# Patient Record
Sex: FEMALE | Race: OTHER | URBAN - METROPOLITAN AREA
[De-identification: names, ages, dates, MRNs, and addresses within clinical notes are randomized per-mention and may not be internally consistent; named-entity substitution may affect disease eponyms.]

---

## 2024-06-12 ENCOUNTER — OFFICE VISIT (OUTPATIENT)
Dept: URBAN - METROPOLITAN AREA CLINIC 70 | Facility: CLINIC | Age: 49
End: 2024-06-12
Payer: COMMERCIAL

## 2024-06-12 ENCOUNTER — LAB OUTSIDE AN ENCOUNTER (OUTPATIENT)
Dept: URBAN - METROPOLITAN AREA CLINIC 70 | Facility: CLINIC | Age: 49
End: 2024-06-12

## 2024-06-12 ENCOUNTER — DASHBOARD ENCOUNTERS (OUTPATIENT)
Age: 49
End: 2024-06-12

## 2024-06-12 VITALS
BODY MASS INDEX: 35.68 KG/M2 | SYSTOLIC BLOOD PRESSURE: 136 MMHG | HEART RATE: 61 BPM | WEIGHT: 189 LBS | HEIGHT: 61 IN | DIASTOLIC BLOOD PRESSURE: 87 MMHG | TEMPERATURE: 98.1 F

## 2024-06-12 DIAGNOSIS — R10.9 RIGHT SIDED ABDOMINAL PAIN: ICD-10-CM

## 2024-06-12 DIAGNOSIS — K82.4 GALLBLADDER POLYP: ICD-10-CM

## 2024-06-12 DIAGNOSIS — D64.9 ANEMIA, UNSPECIFIED TYPE: ICD-10-CM

## 2024-06-12 DIAGNOSIS — Z12.11 COLON CANCER SCREENING: ICD-10-CM

## 2024-06-12 PROCEDURE — 99204 OFFICE O/P NEW MOD 45 MIN: CPT | Performed by: NURSE PRACTITIONER

## 2024-06-12 RX ORDER — METFORMIN HCL 500 MG/1
TABLET ORAL
Qty: 180 TABLET | Status: ON HOLD | COMMUNITY

## 2024-06-12 RX ORDER — LOSARTAN POTASSIUM AND HYDROCHLOROTHIAZIDE 50; 12.5 MG/1; MG/1
TOME UNA TABLETA TODOS LOS D AS TABLET, FILM COATED ORAL
Qty: 90 EACH | Refills: 1 | Status: ACTIVE | COMMUNITY

## 2024-06-12 RX ORDER — FERROUS SULFATE 325(65) MG
TOME UNA TABLETA POR V A ORAL DOS VECES AL D A TABLET ORAL
Qty: 60 EACH | Refills: 0 | Status: ACTIVE | COMMUNITY

## 2024-06-12 RX ORDER — OMEPRAZOLE 20 MG/1
CAPSULE, DELAYED RELEASE ORAL
Qty: 30 CAPSULE | Status: ACTIVE | COMMUNITY

## 2024-06-12 RX ORDER — FLUTICASONE PROPIONATE 50 UG/1
ROCIAR 1 VEZ BY NASAL ROUTE TODOS LOS DIAS SPRAY, METERED NASAL
Qty: 16 GRAM | Refills: 6 | Status: ACTIVE | COMMUNITY

## 2024-06-12 RX ORDER — NORGESTIMATE AND ETHINYL ESTRADIOL 7DAYSX3 LO
KIT ORAL
Qty: 28 TABLET | Status: ON HOLD | COMMUNITY

## 2024-06-12 RX ORDER — CETIRIZINE HYDROCHLORIDE 10 MG/1
TOME UNA TABLETA TODOS LOS D AS TABLET ORAL
Qty: 30 EACH | Refills: 5 | Status: ON HOLD | COMMUNITY

## 2024-06-12 RX ORDER — OMEPRAZOLE 40 MG/1
1 CAPSULE 30 MINUTES BEFORE MORNING MEAL CAPSULE, DELAYED RELEASE ORAL ONCE A DAY
Qty: 90 | Refills: 3

## 2024-06-12 RX ORDER — HYOSCYAMINE SULFATE 0.12 MG/1
TABLET ORAL
Qty: 40 TABLET | Status: ON HOLD | COMMUNITY

## 2024-06-12 RX ORDER — ALBUTEROL SULFATE 90 UG/1
INHALE DANDO DOS SOPLIDOS INTO THE LUNGS CADA CUATRO HORAS CUANDO SEA NECESARIO PARA LA SIBILANCIA AEROSOL, METERED RESPIRATORY (INHALATION)
Qty: 8.5 GRAM | Refills: 2 | Status: ACTIVE | COMMUNITY

## 2024-06-12 RX ORDER — ONDANSETRON 4 MG/1
TABLET, ORALLY DISINTEGRATING ORAL
Qty: 14 EACH | Status: ON HOLD | COMMUNITY

## 2024-06-12 NOTE — PHYSICAL EXAM MUSCULOSKELETAL:
normal gait and station , full range of motion , no tenderness or deformities present
Initiate Treatment: Ln2
Detail Level: Simple

## 2024-06-12 NOTE — HPI-TODAY'S VISIT:
Patient is a 49 y/o Vietnamese speaking female who presents today accompanied by family to assist with translation as an ER follow up. She presented to ER recently for right sided pain. Upon admission she was found to be anemic with H/H 6.3/22.2. She was given blood transfusion. Underwent Abdominal CT 5/26/21 that showed stool in colon and uterine fibroids but no acute process, an abdominal u/s 5/26/24 showed gallbladder polyps with recommendations for repeat u/s in 6 months, and HIDA scan 6/3/24 that showed normal EF. No NSAIDS. No prior EGD/colonoscopy. No known Fhx of colon cancer. No active signs of GI bleeding per pt such as hematemesis, melena, or hematochezia. No longer having menstrual cycles. Intermittent right sided pain is generalized. Denies CP, SOB, wt loss, N/V, dysphagia, constipation, or diarrhea. VSS.

## 2024-06-14 ENCOUNTER — OFFICE VISIT (OUTPATIENT)
Dept: URBAN - METROPOLITAN AREA SURGERY CENTER 24 | Facility: SURGERY CENTER | Age: 49
End: 2024-06-14

## 2024-06-14 ENCOUNTER — TELEPHONE ENCOUNTER (OUTPATIENT)
Dept: URBAN - METROPOLITAN AREA CLINIC 70 | Facility: CLINIC | Age: 49
End: 2024-06-14

## 2024-06-14 ENCOUNTER — CLAIMS CREATED FROM THE CLAIM WINDOW (OUTPATIENT)
Dept: URBAN - METROPOLITAN AREA CLINIC 4 | Facility: CLINIC | Age: 49
End: 2024-06-14
Payer: COMMERCIAL

## 2024-06-14 ENCOUNTER — OUT OF OFFICE VISIT (OUTPATIENT)
Dept: URBAN - METROPOLITAN AREA SURGERY CENTER 24 | Facility: SURGERY CENTER | Age: 49
End: 2024-06-14
Payer: COMMERCIAL

## 2024-06-14 DIAGNOSIS — Z12.11 COLON CANCER SCREENING: ICD-10-CM

## 2024-06-14 DIAGNOSIS — K31.89 OTHER DISEASES OF STOMACH AND DUODENUM: ICD-10-CM

## 2024-06-14 DIAGNOSIS — K21.00 GASTRO-ESOPHAGEAL REFLUX DISEASE WITH ESOPHAGITIS, WITHOUT BLEEDING: ICD-10-CM

## 2024-06-14 DIAGNOSIS — K29.70 GASTRITIS, UNSPECIFIED, WITHOUT BLEEDING: ICD-10-CM

## 2024-06-14 DIAGNOSIS — K63.89 OTHER SPECIFIED DISEASES OF INTESTINE: ICD-10-CM

## 2024-06-14 DIAGNOSIS — K21.9 GASTRO-ESOPHAGEAL REFLUX DISEASE WITHOUT ESOPHAGITIS: ICD-10-CM

## 2024-06-14 DIAGNOSIS — K63.5 COLON POLYP: ICD-10-CM

## 2024-06-14 PROCEDURE — 00813 ANES UPR LWR GI NDSC PX: CPT | Performed by: NURSE ANESTHETIST, CERTIFIED REGISTERED

## 2024-06-14 PROCEDURE — 88312 SPECIAL STAINS GROUP 1: CPT | Performed by: PATHOLOGY

## 2024-06-14 PROCEDURE — 88305 TISSUE EXAM BY PATHOLOGIST: CPT | Performed by: PATHOLOGY

## 2024-06-14 RX ORDER — OMEPRAZOLE 40 MG/1
1 CAPSULE 30 MINUTES BEFORE MORNING MEAL CAPSULE, DELAYED RELEASE ORAL ONCE A DAY
Qty: 90 | Refills: 3 | Status: ACTIVE | COMMUNITY

## 2024-06-14 RX ORDER — ONDANSETRON 4 MG/1
TABLET, ORALLY DISINTEGRATING ORAL
Qty: 14 EACH | Status: ON HOLD | COMMUNITY

## 2024-06-14 RX ORDER — LOSARTAN POTASSIUM AND HYDROCHLOROTHIAZIDE 50; 12.5 MG/1; MG/1
TOME UNA TABLETA TODOS LOS D AS TABLET, FILM COATED ORAL
Qty: 90 EACH | Refills: 1 | Status: ACTIVE | COMMUNITY

## 2024-06-14 RX ORDER — ALBUTEROL SULFATE 90 UG/1
INHALE DANDO DOS SOPLIDOS INTO THE LUNGS CADA CUATRO HORAS CUANDO SEA NECESARIO PARA LA SIBILANCIA AEROSOL, METERED RESPIRATORY (INHALATION)
Qty: 8.5 GRAM | Refills: 2 | Status: ACTIVE | COMMUNITY

## 2024-06-14 RX ORDER — CETIRIZINE HYDROCHLORIDE 10 MG/1
TOME UNA TABLETA TODOS LOS D AS TABLET ORAL
Qty: 30 EACH | Refills: 5 | Status: ON HOLD | COMMUNITY

## 2024-06-14 RX ORDER — HYOSCYAMINE SULFATE 0.12 MG/1
TABLET ORAL
Qty: 40 TABLET | Status: ON HOLD | COMMUNITY

## 2024-06-14 RX ORDER — FLUTICASONE PROPIONATE 50 UG/1
ROCIAR 1 VEZ BY NASAL ROUTE TODOS LOS DIAS SPRAY, METERED NASAL
Qty: 16 GRAM | Refills: 6 | Status: ACTIVE | COMMUNITY

## 2024-06-14 RX ORDER — NORGESTIMATE AND ETHINYL ESTRADIOL 7DAYSX3 LO
KIT ORAL
Qty: 28 TABLET | Status: ON HOLD | COMMUNITY

## 2024-06-14 RX ORDER — FERROUS SULFATE 325(65) MG
TOME UNA TABLETA POR V A ORAL DOS VECES AL D A TABLET ORAL
Qty: 60 EACH | Refills: 0 | Status: ACTIVE | COMMUNITY

## 2024-06-14 RX ORDER — METFORMIN HCL 500 MG/1
TABLET ORAL
Qty: 180 TABLET | Status: ON HOLD | COMMUNITY

## 2024-06-14 RX ORDER — OMEPRAZOLE 40 MG/1
1 CAPSULE 30 MINUTES BEFORE MORNING MEAL CAPSULE, DELAYED RELEASE ORAL ONCE A DAY
Qty: 90 | Refills: 0

## 2024-07-22 ENCOUNTER — OFFICE VISIT (OUTPATIENT)
Dept: URBAN - METROPOLITAN AREA CLINIC 70 | Facility: CLINIC | Age: 49
End: 2024-07-22
Payer: COMMERCIAL

## 2024-07-22 ENCOUNTER — TELEPHONE ENCOUNTER (OUTPATIENT)
Dept: URBAN - METROPOLITAN AREA CLINIC 70 | Facility: CLINIC | Age: 49
End: 2024-07-22

## 2024-07-22 VITALS
WEIGHT: 194.2 LBS | HEIGHT: 61 IN | BODY MASS INDEX: 36.67 KG/M2 | DIASTOLIC BLOOD PRESSURE: 79 MMHG | SYSTOLIC BLOOD PRESSURE: 153 MMHG | TEMPERATURE: 97.3 F | HEART RATE: 60 BPM

## 2024-07-22 DIAGNOSIS — K82.4 GALLBLADDER POLYP: ICD-10-CM

## 2024-07-22 DIAGNOSIS — D64.89 ANEMIA DUE TO OTHER CAUSE: ICD-10-CM

## 2024-07-22 DIAGNOSIS — K27.9 PEPTIC ULCER DISEASE: ICD-10-CM

## 2024-07-22 DIAGNOSIS — R10.13 ABDOMINAL DISCOMFORT, EPIGASTRIC: ICD-10-CM

## 2024-07-22 PROBLEM — 13200003: Status: ACTIVE | Noted: 2024-07-22

## 2024-07-22 PROCEDURE — 99214 OFFICE O/P EST MOD 30 MIN: CPT | Performed by: NURSE PRACTITIONER

## 2024-07-22 RX ORDER — OMEPRAZOLE 40 MG/1
1 CAPSULE CAPSULE, DELAYED RELEASE ORAL TWICE DAILY
Qty: 180 | Refills: 1

## 2024-07-22 RX ORDER — FERROUS SULFATE 325(65) MG
TOME UNA TABLETA POR V A ORAL DOS VECES AL D A TABLET ORAL
Qty: 60 EACH | Refills: 0 | Status: ACTIVE | COMMUNITY

## 2024-07-22 RX ORDER — CETIRIZINE HYDROCHLORIDE 10 MG/1
TOME UNA TABLETA TODOS LOS D AS TABLET ORAL
Qty: 30 EACH | Refills: 5 | Status: ON HOLD | COMMUNITY

## 2024-07-22 RX ORDER — HYOSCYAMINE SULFATE 0.12 MG/1
TABLET ORAL
Qty: 40 TABLET | Status: ON HOLD | COMMUNITY

## 2024-07-22 RX ORDER — LOSARTAN POTASSIUM AND HYDROCHLOROTHIAZIDE 50; 12.5 MG/1; MG/1
TOME UNA TABLETA TODOS LOS D AS TABLET, FILM COATED ORAL
Qty: 90 EACH | Refills: 1 | Status: ACTIVE | COMMUNITY

## 2024-07-22 RX ORDER — FLUTICASONE PROPIONATE 50 UG/1
ROCIAR 1 VEZ BY NASAL ROUTE TODOS LOS DIAS SPRAY, METERED NASAL
Qty: 16 GRAM | Refills: 6 | Status: ACTIVE | COMMUNITY

## 2024-07-22 RX ORDER — NORGESTIMATE AND ETHINYL ESTRADIOL 7DAYSX3 LO
KIT ORAL
Qty: 28 TABLET | Status: ON HOLD | COMMUNITY

## 2024-07-22 RX ORDER — OMEPRAZOLE 40 MG/1
1 CAPSULE 30 MINUTES BEFORE MORNING MEAL CAPSULE, DELAYED RELEASE ORAL ONCE A DAY
Qty: 90 | Refills: 0 | Status: ACTIVE | COMMUNITY

## 2024-07-22 RX ORDER — ALBUTEROL SULFATE 90 UG/1
INHALE DANDO DOS SOPLIDOS INTO THE LUNGS CADA CUATRO HORAS CUANDO SEA NECESARIO PARA LA SIBILANCIA AEROSOL, METERED RESPIRATORY (INHALATION)
Qty: 8.5 GRAM | Refills: 2 | Status: ACTIVE | COMMUNITY

## 2024-07-22 RX ORDER — ONDANSETRON 4 MG/1
TABLET, ORALLY DISINTEGRATING ORAL
Qty: 14 EACH | Status: ON HOLD | COMMUNITY

## 2024-07-22 RX ORDER — METFORMIN HCL 500 MG/1
TABLET ORAL
Qty: 180 TABLET | Status: ON HOLD | COMMUNITY

## 2024-07-22 NOTE — HPI-TODAY'S VISIT:
OV 6/12/24: Patient is a 49 y/o Kyrgyz speaking female who presents today accompanied by family to assist with translation as an ER follow up. She presented to ER recently for right sided pain. Upon admission she was found to be anemic with H/H 6.3/22.2. She was given blood transfusion. Underwent Abdominal CT 5/26/24 that showed stool in colon and uterine fibroids but no acute process, an abdominal u/s 5/26/24 showed gallbladder polyps with recommendations for repeat u/s in 6 months, and HIDA scan 6/3/24 that showed normal EF. No NSAIDS. No prior EGD/colonoscopy. No known Fhx of colon cancer. No active signs of GI bleeding per pt such as hematemesis, melena, or hematochezia. No longer having menstrual cycles. Intermittent right sided pain is generalized. Denies CP, SOB, wt loss, N/V, dysphagia, constipation, or diarrhea. VSS. ---------------------- Today 7/22/24: Patient presents today for follow up. Underwent EGD/colonoscopy 6/14/24 with findings of esophagitis, small hiatal hernia, gastritis, gastric ulcer (bx neg), benign colon polyp, and diverticulosis (recall on colonoscopy in 10 years). Results discussed with patient. No active signs of GI bleeding. Has been compliant with daily PPI with prior right sided pain now improved. No new GI complaints.

## 2024-10-23 ENCOUNTER — OFFICE VISIT (OUTPATIENT)
Dept: URBAN - METROPOLITAN AREA CLINIC 70 | Facility: CLINIC | Age: 49
End: 2024-10-23

## 2024-10-30 ENCOUNTER — OFFICE VISIT (OUTPATIENT)
Dept: URBAN - METROPOLITAN AREA CLINIC 70 | Facility: CLINIC | Age: 49
End: 2024-10-30

## 2024-11-07 ENCOUNTER — OFFICE VISIT (OUTPATIENT)
Dept: URBAN - METROPOLITAN AREA CLINIC 70 | Facility: CLINIC | Age: 49
End: 2024-11-07

## 2024-11-21 ENCOUNTER — LAB OUTSIDE AN ENCOUNTER (OUTPATIENT)
Dept: URBAN - METROPOLITAN AREA CLINIC 70 | Facility: CLINIC | Age: 49
End: 2024-11-21

## 2024-11-21 ENCOUNTER — OFFICE VISIT (OUTPATIENT)
Dept: URBAN - METROPOLITAN AREA CLINIC 70 | Facility: CLINIC | Age: 49
End: 2024-11-21
Payer: COMMERCIAL

## 2024-11-21 VITALS
HEART RATE: 63 BPM | WEIGHT: 200.2 LBS | BODY MASS INDEX: 37.8 KG/M2 | SYSTOLIC BLOOD PRESSURE: 105 MMHG | HEIGHT: 61 IN | DIASTOLIC BLOOD PRESSURE: 58 MMHG | TEMPERATURE: 97.7 F

## 2024-11-21 DIAGNOSIS — R10.9 RIGHT SIDED ABDOMINAL PAIN: ICD-10-CM

## 2024-11-21 DIAGNOSIS — K82.4 GALLBLADDER POLYP: ICD-10-CM

## 2024-11-21 DIAGNOSIS — K27.9 PEPTIC ULCER DISEASE: ICD-10-CM

## 2024-11-21 DIAGNOSIS — D64.9 ANEMIA, UNSPECIFIED TYPE: ICD-10-CM

## 2024-11-21 DIAGNOSIS — Z12.11 COLON CANCER SCREENING: ICD-10-CM

## 2024-11-21 PROCEDURE — 99214 OFFICE O/P EST MOD 30 MIN: CPT | Performed by: NURSE PRACTITIONER

## 2024-11-21 RX ORDER — LOSARTAN POTASSIUM AND HYDROCHLOROTHIAZIDE 50; 12.5 MG/1; MG/1
TOME UNA TABLETA TODOS LOS D AS TABLET, FILM COATED ORAL
Qty: 90 EACH | Refills: 1 | Status: ACTIVE | COMMUNITY

## 2024-11-21 RX ORDER — OMEPRAZOLE 40 MG/1
1 CAPSULE CAPSULE, DELAYED RELEASE ORAL TWICE DAILY
Qty: 180 | Refills: 1 | Status: ACTIVE | COMMUNITY

## 2024-11-21 RX ORDER — FERROUS SULFATE 325(65) MG
TOME UNA TABLETA POR V A ORAL DOS VECES AL D A TABLET ORAL
Qty: 60 EACH | Refills: 0 | Status: DISCONTINUED | COMMUNITY

## 2024-11-21 RX ORDER — ALBUTEROL SULFATE 90 UG/1
INHALE DANDO DOS SOPLIDOS INTO THE LUNGS CADA CUATRO HORAS CUANDO SEA NECESARIO PARA LA SIBILANCIA AEROSOL, METERED RESPIRATORY (INHALATION)
Qty: 8.5 GRAM | Refills: 2 | Status: ACTIVE | COMMUNITY

## 2024-11-21 RX ORDER — LEVOCETIRIZINE DIHYDROCHLORIDE 5 MG/1
TABLET ORAL
Qty: 30 TABLET | Status: ACTIVE | COMMUNITY

## 2024-11-21 RX ORDER — CETIRIZINE HYDROCHLORIDE 10 MG/1
TOME UNA TABLETA TODOS LOS D AS TABLET ORAL
Qty: 30 EACH | Refills: 5 | Status: ON HOLD | COMMUNITY

## 2024-11-21 RX ORDER — FLUTICASONE PROPIONATE 50 UG/1
ROCIAR 1 VEZ BY NASAL ROUTE TODOS LOS DIAS SPRAY, METERED NASAL
Qty: 16 GRAM | Refills: 6 | Status: ACTIVE | COMMUNITY

## 2024-11-21 RX ORDER — NORGESTIMATE AND ETHINYL ESTRADIOL 7DAYSX3 LO
KIT ORAL
Qty: 28 TABLET | Status: ON HOLD | COMMUNITY

## 2024-11-21 RX ORDER — ERGOCALCIFEROL 1.25 MG/1
CAPSULE, LIQUID FILLED ORAL
Qty: 12 CAPSULE | Status: ACTIVE | COMMUNITY

## 2024-11-21 RX ORDER — HYOSCYAMINE SULFATE 0.12 MG/1
TABLET ORAL
Qty: 40 TABLET | Status: ON HOLD | COMMUNITY

## 2024-11-21 RX ORDER — OMEPRAZOLE 40 MG/1
1 CAPSULE CAPSULE, DELAYED RELEASE ORAL TWICE DAILY
Qty: 180 | Refills: 1

## 2024-11-21 RX ORDER — SUCRALFATE 1 G/1
TOME 1 TABLETA POR V A ORAL DOS VECES AL D A EN EST MAGO VAC O FOR 30 DAYS TABLET ORAL
Qty: 60 EACH | Refills: 2 | Status: ACTIVE | COMMUNITY

## 2024-11-21 RX ORDER — METFORMIN HCL 500 MG/1
TABLET ORAL
Qty: 180 TABLET | Status: ON HOLD | COMMUNITY

## 2024-11-21 RX ORDER — FERROUS GLUCONATE 324 MG
1 TABLET TABLET ORAL
Status: ACTIVE | COMMUNITY

## 2024-11-21 RX ORDER — ONDANSETRON 4 MG/1
TABLET, ORALLY DISINTEGRATING ORAL
Qty: 14 EACH | Status: ON HOLD | COMMUNITY

## 2024-11-21 NOTE — HPI-TODAY'S VISIT:
OV 6/12/24: Patient is a 49 y/o Swedish speaking female who presents today accompanied by family to assist with translation as an ER follow up. She presented to ER recently for right sided pain. Upon admission she was found to be anemic with H/H 6.3/22.2. She was given blood transfusion. Underwent Abdominal CT 5/26/24 that showed stool in colon and uterine fibroids but no acute process, an abdominal u/s 5/26/24 showed gallbladder polyps with recommendations for repeat u/s in 6 months, and HIDA scan 6/3/24 that showed normal EF. No NSAIDS. No prior EGD/colonoscopy. No known Fhx of colon cancer. No active signs of GI bleeding per pt such as hematemesis, melena, or hematochezia. No longer having menstrual cycles. Intermittent right sided pain is generalized. Denies CP, SOB, wt loss, N/V, dysphagia, constipation, or diarrhea. VSS. - - - - - - - - - - -- OV 7/22/24: Patient presents today for follow up. Underwent EGD/colonoscopy 6/14/24 with findings of esophagitis, small hiatal hernia, gastritis, gastric ulcer (bx neg), benign colon polyp, and diverticulosis (recall on colonoscopy in 10 years). Results discussed with patient. No active signs of GI bleeding. Has been compliant with daily PPI with prior right sided pain now improved. No new GI complaints. - - - - - - - - - -  Today 11/21/24: Patient presents today for follow up. She is not taking PPI as prescribed. She only took for 30 days then stopped medication. Has continued RUQ pain. No active signs of GI bleeding or new symptoms. VSS.

## 2025-01-29 ENCOUNTER — LAB OUTSIDE AN ENCOUNTER (OUTPATIENT)
Dept: URBAN - METROPOLITAN AREA CLINIC 70 | Facility: CLINIC | Age: 50
End: 2025-01-29

## 2025-01-29 ENCOUNTER — OFFICE VISIT (OUTPATIENT)
Dept: URBAN - METROPOLITAN AREA CLINIC 70 | Facility: CLINIC | Age: 50
End: 2025-01-29
Payer: COMMERCIAL

## 2025-01-29 VITALS
OXYGEN SATURATION: 98 % | BODY MASS INDEX: 37.91 KG/M2 | HEART RATE: 73 BPM | TEMPERATURE: 98.1 F | HEIGHT: 61 IN | WEIGHT: 200.8 LBS

## 2025-01-29 DIAGNOSIS — K27.9 PEPTIC ULCER DISEASE: ICD-10-CM

## 2025-01-29 DIAGNOSIS — R10.9 RIGHT SIDED ABDOMINAL PAIN: ICD-10-CM

## 2025-01-29 DIAGNOSIS — Z12.11 COLON CANCER SCREENING: ICD-10-CM

## 2025-01-29 DIAGNOSIS — K82.4 GALLBLADDER POLYP: ICD-10-CM

## 2025-01-29 DIAGNOSIS — D64.9 ANEMIA, UNSPECIFIED TYPE: ICD-10-CM

## 2025-01-29 PROCEDURE — 99214 OFFICE O/P EST MOD 30 MIN: CPT | Performed by: NURSE PRACTITIONER

## 2025-01-29 RX ORDER — METFORMIN HCL 500 MG/1
TABLET ORAL
Qty: 180 TABLET | Status: DISCONTINUED | COMMUNITY

## 2025-01-29 RX ORDER — FLUTICASONE PROPIONATE 50 UG/1
ROCIAR 1 VEZ BY NASAL ROUTE TODOS LOS DIAS SPRAY, METERED NASAL
Qty: 16 GRAM | Refills: 6 | Status: DISCONTINUED | COMMUNITY

## 2025-01-29 RX ORDER — LOSARTAN POTASSIUM AND HYDROCHLOROTHIAZIDE 50; 12.5 MG/1; MG/1
TOME UNA TABLETA TODOS LOS D AS TABLET, FILM COATED ORAL
Qty: 90 EACH | Refills: 1 | Status: ACTIVE | COMMUNITY

## 2025-01-29 RX ORDER — ALBUTEROL SULFATE 90 UG/1
INHALE DANDO DOS SOPLIDOS INTO THE LUNGS CADA CUATRO HORAS CUANDO SEA NECESARIO PARA LA SIBILANCIA AEROSOL, METERED RESPIRATORY (INHALATION)
Qty: 8.5 GRAM | Refills: 2 | Status: ACTIVE | COMMUNITY

## 2025-01-29 RX ORDER — OMEPRAZOLE 40 MG/1
1 CAPSULE CAPSULE, DELAYED RELEASE ORAL TWICE DAILY
Qty: 180 | Refills: 1 | Status: ACTIVE | COMMUNITY

## 2025-01-29 RX ORDER — FERROUS GLUCONATE 324(37.5)
1 TABLET TABLET ORAL
Qty: 36 | Refills: 1

## 2025-01-29 RX ORDER — FERROUS GLUCONATE 324 MG
1 TABLET TABLET ORAL
Status: DISCONTINUED | COMMUNITY

## 2025-01-29 RX ORDER — HYOSCYAMINE SULFATE 0.12 MG/1
TABLET ORAL
Qty: 40 TABLET | Status: DISCONTINUED | COMMUNITY

## 2025-01-29 RX ORDER — FERROUS GLUCONATE 324 MG
1 TABLET TABLET ORAL
Status: ACTIVE | COMMUNITY

## 2025-01-29 RX ORDER — BROMPHENIRAMINE MALEATE, PSEUDOEPHEDRINE HYDROCHLORIDE AND DEXTROMETHORPHAN HYDROBROMIDE 2; 30; 10 MG/5ML; MG/5ML; MG/5ML
TAKE 10ML POR V A ORAL CUATRO VECES AL D A PARA LA TOS Y LA CONGESTI N CUANDO SEA NECESARIO SYRUP ORAL
Qty: 118 MILLILITER | Refills: 0 | Status: ACTIVE | COMMUNITY

## 2025-01-29 RX ORDER — OMEPRAZOLE 40 MG/1
1 CAPSULE CAPSULE, DELAYED RELEASE ORAL TWICE DAILY
Qty: 180 | Refills: 1

## 2025-01-29 RX ORDER — LEVOCETIRIZINE DIHYDROCHLORIDE 5 MG/1
TABLET ORAL
Qty: 30 TABLET | Status: ACTIVE | COMMUNITY

## 2025-01-29 RX ORDER — SUCRALFATE 1 G/1
TOME 1 TABLETA POR V A ORAL DOS VECES AL D A EN EST MAGO VAC O FOR 30 DAYS TABLET ORAL
Qty: 60 EACH | Refills: 2 | Status: DISCONTINUED | COMMUNITY

## 2025-01-29 RX ORDER — NORGESTIMATE AND ETHINYL ESTRADIOL 7DAYSX3 LO
KIT ORAL
Qty: 28 TABLET | Status: DISCONTINUED | COMMUNITY

## 2025-01-29 RX ORDER — ONDANSETRON 4 MG/1
TABLET, ORALLY DISINTEGRATING ORAL
Qty: 14 EACH | Status: DISCONTINUED | COMMUNITY

## 2025-01-29 RX ORDER — ERGOCALCIFEROL 1.25 MG/1
CAPSULE, LIQUID FILLED ORAL
Qty: 12 CAPSULE | Status: ON HOLD | COMMUNITY

## 2025-01-29 RX ORDER — CETIRIZINE HYDROCHLORIDE 10 MG/1
TOME UNA TABLETA TODOS LOS D AS TABLET ORAL
Qty: 30 EACH | Refills: 5 | Status: DISCONTINUED | COMMUNITY

## 2025-01-29 NOTE — HPI-TODAY'S VISIT:
OV 6/12/24: Patient is a 47 y/o Yi speaking female who presents today accompanied by family to assist with translation as an ER follow up. She presented to ER recently for right sided pain. Upon admission she was found to be anemic with H/H 6.3/22.2. She was given blood transfusion. Underwent Abdominal CT 5/26/24 that showed stool in colon and uterine fibroids but no acute process, an abdominal u/s 5/26/24 showed gallbladder polyps with recommendations for repeat u/s in 6 months, and HIDA scan 6/3/24 that showed normal EF. No NSAIDS. No prior EGD/colonoscopy. No known Fhx of colon cancer. No active signs of GI bleeding per pt such as hematemesis, melena, or hematochezia. No longer having menstrual cycles. Intermittent right sided pain is generalized. Denies CP, SOB, wt loss, N/V, dysphagia, constipation, or diarrhea. VSS. - - - - - - - - - - -- OV 7/22/24: Patient presents today for follow up. Underwent EGD/colonoscopy 6/14/24 with findings of esophagitis, small hiatal hernia, gastritis, gastric ulcer (bx neg), benign colon polyp, and diverticulosis (recall on colonoscopy in 10 years). Results discussed with patient. No active signs of GI bleeding. Has been compliant with daily PPI with prior right sided pain now improved. No new GI complaints. - - - - - - - - - -  OV 11/21/24: Patient presents today for follow up. She is not taking PPI as prescribed. She only took for 30 days then stopped medication. Has continued RUQ pain. No active signs of GI bleeding or new symptoms. VSS. - - - - - - - - - - Today 1/29/25: Patient presents today for follow up. She has been compliant with PPI BID since last OV with prior abdominal pain now resolved. Taking daily iron supplement for associated anemia. Surveillance u/s of gallbladder polyp is still pending to be completed. No current GI complaints or signs of GI bleeding.

## 2025-02-12 ENCOUNTER — OFFICE VISIT (OUTPATIENT)
Dept: URBAN - METROPOLITAN AREA SURGERY CENTER 24 | Facility: SURGERY CENTER | Age: 50
End: 2025-02-12
Payer: COMMERCIAL

## 2025-02-12 DIAGNOSIS — R10.13 EPIGASTRIC ABDOMINAL PAIN: ICD-10-CM

## 2025-02-12 DIAGNOSIS — K21.9 GASTRO-ESOPHAGEAL REFLUX DISEASE WITHOUT ESOPHAGITIS: ICD-10-CM

## 2025-02-12 DIAGNOSIS — Z87.11 HISTORY OF GASTRIC ULCER: ICD-10-CM

## 2025-02-12 DIAGNOSIS — K27.9 PEPTIC ULCER, SITE UNSPECIFIED, UNSPECIFIED AS ACUTE OR CHRONIC, WITHOUT HEMORRHAGE OR PERFORATION: ICD-10-CM

## 2025-02-12 PROCEDURE — 43235 EGD DIAGNOSTIC BRUSH WASH: CPT | Performed by: INTERNAL MEDICINE

## 2025-02-12 PROCEDURE — 00731 ANES UPR GI NDSC PX NOS: CPT | Performed by: NURSE ANESTHETIST, CERTIFIED REGISTERED

## 2025-02-12 RX ORDER — LEVOCETIRIZINE DIHYDROCHLORIDE 5 MG/1
TABLET ORAL
Qty: 30 TABLET | Status: ACTIVE | COMMUNITY

## 2025-02-12 RX ORDER — LOSARTAN POTASSIUM AND HYDROCHLOROTHIAZIDE 50; 12.5 MG/1; MG/1
TOME UNA TABLETA TODOS LOS D AS TABLET, FILM COATED ORAL
Qty: 90 EACH | Refills: 1 | Status: ACTIVE | COMMUNITY

## 2025-02-12 RX ORDER — FERROUS GLUCONATE 324(37.5)
1 TABLET TABLET ORAL
Qty: 36 | Refills: 1 | Status: ACTIVE | COMMUNITY

## 2025-02-12 RX ORDER — ALBUTEROL SULFATE 90 UG/1
INHALE DANDO DOS SOPLIDOS INTO THE LUNGS CADA CUATRO HORAS CUANDO SEA NECESARIO PARA LA SIBILANCIA AEROSOL, METERED RESPIRATORY (INHALATION)
Qty: 8.5 GRAM | Refills: 2 | Status: ACTIVE | COMMUNITY

## 2025-02-12 RX ORDER — BROMPHENIRAMINE MALEATE, PSEUDOEPHEDRINE HYDROCHLORIDE AND DEXTROMETHORPHAN HYDROBROMIDE 2; 30; 10 MG/5ML; MG/5ML; MG/5ML
TAKE 10ML POR V A ORAL CUATRO VECES AL D A PARA LA TOS Y LA CONGESTI N CUANDO SEA NECESARIO SYRUP ORAL
Qty: 118 MILLILITER | Refills: 0 | Status: ACTIVE | COMMUNITY

## 2025-02-12 RX ORDER — ERGOCALCIFEROL 1.25 MG/1
CAPSULE, LIQUID FILLED ORAL
Qty: 12 CAPSULE | Status: ON HOLD | COMMUNITY

## 2025-02-12 RX ORDER — OMEPRAZOLE 40 MG/1
1 CAPSULE CAPSULE, DELAYED RELEASE ORAL TWICE DAILY
Qty: 180 | Refills: 1 | Status: ACTIVE | COMMUNITY

## 2025-03-12 ENCOUNTER — OFFICE VISIT (OUTPATIENT)
Dept: URBAN - METROPOLITAN AREA CLINIC 70 | Facility: CLINIC | Age: 50
End: 2025-03-12
Payer: COMMERCIAL

## 2025-03-12 VITALS
WEIGHT: 201.2 LBS | HEIGHT: 61 IN | SYSTOLIC BLOOD PRESSURE: 127 MMHG | DIASTOLIC BLOOD PRESSURE: 86 MMHG | TEMPERATURE: 98.1 F | HEART RATE: 65 BPM | BODY MASS INDEX: 37.99 KG/M2 | OXYGEN SATURATION: 98 %

## 2025-03-12 DIAGNOSIS — Z12.11 COLON CANCER SCREENING: ICD-10-CM

## 2025-03-12 DIAGNOSIS — K27.9 PEPTIC ULCER DISEASE: ICD-10-CM

## 2025-03-12 DIAGNOSIS — R10.9 RIGHT SIDED ABDOMINAL PAIN: ICD-10-CM

## 2025-03-12 DIAGNOSIS — D64.9 ANEMIA, UNSPECIFIED TYPE: ICD-10-CM

## 2025-03-12 DIAGNOSIS — K82.4 GALLBLADDER POLYP: ICD-10-CM

## 2025-03-12 PROCEDURE — 99214 OFFICE O/P EST MOD 30 MIN: CPT | Performed by: NURSE PRACTITIONER

## 2025-03-12 RX ORDER — FERROUS GLUCONATE 324(37.5)
1 TABLET TABLET ORAL
Qty: 36 | Refills: 1 | Status: ACTIVE | COMMUNITY

## 2025-03-12 RX ORDER — LEVOCETIRIZINE DIHYDROCHLORIDE 5 MG/1
TABLET ORAL
Qty: 30 TABLET | Status: ACTIVE | COMMUNITY

## 2025-03-12 RX ORDER — ALBUTEROL SULFATE 90 UG/1
INHALE DANDO DOS SOPLIDOS INTO THE LUNGS CADA CUATRO HORAS CUANDO SEA NECESARIO PARA LA SIBILANCIA AEROSOL, METERED RESPIRATORY (INHALATION)
Qty: 8.5 GRAM | Refills: 2 | Status: ACTIVE | COMMUNITY

## 2025-03-12 RX ORDER — LOSARTAN POTASSIUM AND HYDROCHLOROTHIAZIDE 50; 12.5 MG/1; MG/1
TOME UNA TABLETA TODOS LOS D AS TABLET, FILM COATED ORAL
Qty: 90 EACH | Refills: 1 | Status: ACTIVE | COMMUNITY

## 2025-03-12 RX ORDER — BROMPHENIRAMINE MALEATE, PSEUDOEPHEDRINE HYDROCHLORIDE AND DEXTROMETHORPHAN HYDROBROMIDE 2; 30; 10 MG/5ML; MG/5ML; MG/5ML
TAKE 10ML POR V A ORAL CUATRO VECES AL D A PARA LA TOS Y LA CONGESTI N CUANDO SEA NECESARIO SYRUP ORAL
Qty: 118 MILLILITER | Refills: 0 | Status: ACTIVE | COMMUNITY

## 2025-03-12 RX ORDER — ERGOCALCIFEROL 1.25 MG/1
CAPSULE, LIQUID FILLED ORAL
Qty: 12 CAPSULE | Status: ON HOLD | COMMUNITY

## 2025-03-12 RX ORDER — OMEPRAZOLE 40 MG/1
1 CAPSULE CAPSULE, DELAYED RELEASE ORAL TWICE DAILY
Qty: 180 | Refills: 1 | Status: ACTIVE | COMMUNITY

## 2025-03-12 RX ORDER — OMEPRAZOLE 40 MG/1
1 CAPSULE CAPSULE, DELAYED RELEASE ORAL ONCE A DAY
Qty: 90 CAPSULE | Refills: 3

## 2025-03-12 NOTE — HPI-TODAY'S VISIT:
OV 6/12/24: Patient is a 49 y/o Tajik speaking female who presents today accompanied by family to assist with translation as an ER follow up. She presented to ER recently for right sided pain. Upon admission she was found to be anemic with H/H 6.3/22.2. She was given blood transfusion. Underwent Abdominal CT 5/26/24 that showed stool in colon and uterine fibroids but no acute process, an abdominal u/s 5/26/24 showed gallbladder polyps with recommendations for repeat u/s in 6 months, and HIDA scan 6/3/24 that showed normal EF. No NSAIDS. No prior EGD/colonoscopy. No known Fhx of colon cancer. No active signs of GI bleeding per pt such as hematemesis, melena, or hematochezia. No longer having menstrual cycles. Intermittent right sided pain is generalized. Denies CP, SOB, wt loss, N/V, dysphagia, constipation, or diarrhea. VSS. - - - - - - - - - - -- OV 7/22/24: Patient presents today for follow up. Underwent EGD/colonoscopy 6/14/24 with findings of esophagitis, small hiatal hernia, gastritis, gastric ulcer (bx neg), benign colon polyp, and diverticulosis (recall on colonoscopy in 10 years). Results discussed with patient. No active signs of GI bleeding. Has been compliant with daily PPI with prior right sided pain now improved. No new GI complaints. - - - - - - - - - -  OV 11/21/24: Patient presents today for follow up. She is not taking PPI as prescribed. She only took for 30 days then stopped medication. Has continued RUQ pain. No active signs of GI bleeding or new symptoms. VSS. - - - - - - - - - - OV 1/29/25: Patient presents today for follow up. She has been compliant with PPI BID since last OV with prior abdominal pain now resolved. Taking daily iron supplement for associated anemia. Surveillance u/s of gallbladder polyp is still pending to be completed. No current GI complaints or signs of GI bleeding. - - - - - - - - - - Today 3/12/25: Patient presents today for follow up. Underwent EGD 2/12/25 with normal findings showing prior gastric ulcer now healed. Abdominal u/s 2/19/25 showed gallbladder polyp 4mm which is smaller in size than original u/s. Results discussed with patient. She reports going well. No recurrent UGI symptoms or new GI complaints.

## 2025-04-10 ENCOUNTER — LAB OUTSIDE AN ENCOUNTER (OUTPATIENT)
Dept: URBAN - METROPOLITAN AREA CLINIC 70 | Facility: CLINIC | Age: 50
End: 2025-04-10

## 2025-04-10 ENCOUNTER — OFFICE VISIT (OUTPATIENT)
Dept: URBAN - METROPOLITAN AREA CLINIC 70 | Facility: CLINIC | Age: 50
End: 2025-04-10
Payer: COMMERCIAL

## 2025-04-10 DIAGNOSIS — K59.01 SLOW TRANSIT CONSTIPATION: ICD-10-CM

## 2025-04-10 DIAGNOSIS — D64.9 ANEMIA, UNSPECIFIED TYPE: ICD-10-CM

## 2025-04-10 DIAGNOSIS — K27.9 PEPTIC ULCER DISEASE: ICD-10-CM

## 2025-04-10 DIAGNOSIS — K82.4 GALLBLADDER POLYP: ICD-10-CM

## 2025-04-10 DIAGNOSIS — Z12.11 COLON CANCER SCREENING: ICD-10-CM

## 2025-04-10 DIAGNOSIS — R14.0 BLOATING: ICD-10-CM

## 2025-04-10 PROBLEM — 35298007: Status: ACTIVE | Noted: 2025-04-10

## 2025-04-10 PROCEDURE — 99214 OFFICE O/P EST MOD 30 MIN: CPT | Performed by: NURSE PRACTITIONER

## 2025-04-10 RX ORDER — TOPIRAMATE 50 MG/1
TOME 1 TABLETA POR V A ORAL TODOS LOS D AS POR LA NOCHE TABLET ORAL
Qty: 30 EACH | Refills: 0 | Status: ACTIVE | COMMUNITY

## 2025-04-10 RX ORDER — ERGOCALCIFEROL 1.25 MG/1
CAPSULE, LIQUID FILLED ORAL
Qty: 12 CAPSULE | Status: ON HOLD | COMMUNITY

## 2025-04-10 RX ORDER — BUTALBITAL, ACETAMINOPHEN, CAFFEINE AND CODEINE PHOSPHATE 50; 325; 40; 30 MG/1; MG/1; MG/1; MG/1
TAKE 1 CAPSULE BY MOUTH EVERY 4 HOURS AS NEEDED CAPSULE ORAL
Qty: 10 EACH | Refills: 0 | Status: ACTIVE | COMMUNITY

## 2025-04-10 RX ORDER — LOSARTAN POTASSIUM AND HYDROCHLOROTHIAZIDE 50; 12.5 MG/1; MG/1
TOME UNA TABLETA TODOS LOS D AS TABLET, FILM COATED ORAL
Qty: 90 EACH | Refills: 1 | Status: ACTIVE | COMMUNITY

## 2025-04-10 RX ORDER — ALBUTEROL SULFATE 90 UG/1
INHALE DANDO DOS SOPLIDOS INTO THE LUNGS CADA CUATRO HORAS CUANDO SEA NECESARIO PARA LA SIBILANCIA AEROSOL, METERED RESPIRATORY (INHALATION)
Qty: 8.5 GRAM | Refills: 2 | Status: ACTIVE | COMMUNITY

## 2025-04-10 RX ORDER — BROMPHENIRAMINE MALEATE, PSEUDOEPHEDRINE HYDROCHLORIDE AND DEXTROMETHORPHAN HYDROBROMIDE 2; 30; 10 MG/5ML; MG/5ML; MG/5ML
TAKE 10ML POR V A ORAL CUATRO VECES AL D A PARA LA TOS Y LA CONGESTI N CUANDO SEA NECESARIO SYRUP ORAL
Qty: 118 MILLILITER | Refills: 0 | Status: ACTIVE | COMMUNITY

## 2025-04-10 RX ORDER — LEVOCETIRIZINE DIHYDROCHLORIDE 5 MG/1
TABLET ORAL
Qty: 30 TABLET | Status: ACTIVE | COMMUNITY

## 2025-04-10 RX ORDER — FERROUS GLUCONATE 324(37.5)
1 TABLET TABLET ORAL
Qty: 36 | Refills: 1 | Status: ACTIVE | COMMUNITY

## 2025-04-10 RX ORDER — OMEPRAZOLE 40 MG/1
1 CAPSULE CAPSULE, DELAYED RELEASE ORAL ONCE A DAY
Qty: 90 CAPSULE | Refills: 3 | Status: ACTIVE | COMMUNITY

## 2025-04-10 RX ORDER — IBUPROFEN 600 MG/1
TAKE 1 TABLET BY MOUTH EVERY 8 HOURS WITH FOOD OR MILK AS NEEDED TABLET, FILM COATED ORAL
Qty: 30 EACH | Refills: 0 | Status: ACTIVE | COMMUNITY

## 2025-04-10 RX ORDER — OMEPRAZOLE 40 MG/1
1 CAPSULE CAPSULE, DELAYED RELEASE ORAL ONCE A DAY
Qty: 90 CAPSULE | Refills: 3 | OUTPATIENT
Start: 2025-04-10

## 2025-04-10 NOTE — HPI-TODAY'S VISIT:
OV 6/12/24: Patient is a 47 y/o Chinese speaking female who presents today accompanied by family to assist with translation as an ER follow up. She presented to ER recently for right sided pain. Upon admission she was found to be anemic with H/H 6.3/22.2. She was given blood transfusion. Underwent Abdominal CT 5/26/24 that showed stool in colon and uterine fibroids but no acute process, an abdominal u/s 5/26/24 showed gallbladder polyps with recommendations for repeat u/s in 6 months, and HIDA scan 6/3/24 that showed normal EF. No NSAIDS. No prior EGD/colonoscopy. No known Fhx of colon cancer. No active signs of GI bleeding per pt such as hematemesis, melena, or hematochezia. No longer having menstrual cycles. Intermittent right sided pain is generalized. Denies CP, SOB, wt loss, N/V, dysphagia, constipation, or diarrhea. VSS. - - - - - - - - - - -- OV 7/22/24: Patient presents today for follow up. Underwent EGD/colonoscopy 6/14/24 with findings of esophagitis, small hiatal hernia, gastritis, gastric ulcer (bx neg), benign colon polyp, and diverticulosis (recall on colonoscopy in 10 years). Results discussed with patient. No active signs of GI bleeding. Has been compliant with daily PPI with prior right sided pain now improved. No new GI complaints. - - - - - - - - - -  OV 11/21/24: Patient presents today for follow up. She is not taking PPI as prescribed. She only took for 30 days then stopped medication. Has continued RUQ pain. No active signs of GI bleeding or new symptoms. VSS. - - - - - - - - - - OV 1/29/25: Patient presents today for follow up. She has been compliant with PPI BID since last OV with prior abdominal pain now resolved. Taking daily iron supplement for associated anemia. Surveillance u/s of gallbladder polyp is still pending to be completed. No current GI complaints or signs of GI bleeding. - - - - - - - - - - OV 3/12/25: Patient presents today for follow up. Underwent EGD 2/12/25 with normal findings showing prior gastric ulcer now healed. Abdominal u/s 2/19/25 showed gallbladder polyp 4mm which is smaller in size than original u/s. Results discussed with patient. She reports going well. No recurrent UGI symptoms or new GI complaints. ------------------ Today 4/10/25: Patient presents today with generalized abdominal discomfort described as bloating. Has BM daily but  feeling on incomplete evacuation. CT last year showed stool in colon but nothing acute. EGD/colon w/o source. Compliant with PPI. Denies fever, wt loss, N/V, dysphagia, diarrhea, or signs of GI Bleeding.

## 2025-04-10 NOTE — PHYSICAL EXAM LYMPHATIC:
Neck , no lymphadenopathy Patient complains of continuous headache. Tylenol not helping. Writer contacted  and awaiting new orders for different pain medications. Will administer when verified by pharmacy.

## 2025-05-08 ENCOUNTER — OFFICE VISIT (OUTPATIENT)
Dept: URBAN - METROPOLITAN AREA CLINIC 70 | Facility: CLINIC | Age: 50
End: 2025-05-08